# Patient Record
Sex: FEMALE | Race: WHITE | NOT HISPANIC OR LATINO | URBAN - METROPOLITAN AREA
[De-identification: names, ages, dates, MRNs, and addresses within clinical notes are randomized per-mention and may not be internally consistent; named-entity substitution may affect disease eponyms.]

---

## 2021-08-10 ENCOUNTER — NEW PATIENT COMPREHENSIVE (OUTPATIENT)
Dept: URBAN - METROPOLITAN AREA CLINIC 53 | Facility: CLINIC | Age: 56
End: 2021-08-10

## 2021-08-10 DIAGNOSIS — E11.9: ICD-10-CM

## 2021-08-10 DIAGNOSIS — H25.12: ICD-10-CM

## 2021-08-10 DIAGNOSIS — H43.813: ICD-10-CM

## 2021-08-10 DIAGNOSIS — H40.013: ICD-10-CM

## 2021-08-10 DIAGNOSIS — H26.491: ICD-10-CM

## 2021-08-10 DIAGNOSIS — H04.123: ICD-10-CM

## 2021-08-10 PROBLEM — Z98.890: Noted: 2021-08-10

## 2021-08-10 PROCEDURE — 76514 ECHO EXAM OF EYE THICKNESS: CPT

## 2021-08-10 PROCEDURE — 92015 DETERMINE REFRACTIVE STATE: CPT

## 2021-08-10 PROCEDURE — 92004 COMPRE OPH EXAM NEW PT 1/>: CPT

## 2021-08-10 PROCEDURE — 66821 AFTER CATARACT LASER SURGERY: CPT

## 2021-08-10 PROCEDURE — 92134 CPTRZ OPH DX IMG PST SGM RTA: CPT

## 2021-08-10 ASSESSMENT — PACHYMETRY
OS_CT_UM: 577
OD_CT_UM: 577

## 2021-08-10 ASSESSMENT — VISUAL ACUITY
OS_GLARE: 20/100
OD_GLARE: 20/100
OS_CC: J2 @ 16IN
OD_SC: J1 @ 13IN
OS_GLARE: 20/150
OD_CC: J5 @ 16IN
OD_SC: 20/400
OU_CC: 20/25
OU_SC: J1+ @ 13IN
OS_CC: 20/25
OS_SC: CF 6FT
OS_PH: 20/150
OU_CC: J1 @ 16IN
OD_GLARE: 20/150
OD_CC: 20/40+1
OS_SC: J1+ @ 13IN
OD_PH: 20/80

## 2021-08-10 ASSESSMENT — TONOMETRY
OD_IOP_MMHG: 17
OS_IOP_MMHG: 20
OS_IOP_MMHG: 18
OD_IOP_MMHG: 19

## 2021-08-10 NOTE — PATIENT DISCUSSION
Patient has been followed as a glaucoma suspect in the past. IOP stable OU. Increased c:d ratio. Schedule next available HVF/RNFL OCT. Request previous glaucoma records.

## 2021-08-10 NOTE — PATIENT DISCUSSION
PCO ([id number]): Visually significant PCO present on exam today. Recommend YAG laser capsulotomy to improve vision and decrease glare symptoms. RBAs of procedure discussed. Patient agrees and wishes to proceed.

## 2021-08-10 NOTE — PATIENT DISCUSSION
Patient currently using AT's QID. Advised to switch to PF AT's. Start Gel AT's Qhs. Patient has used [employer] in the past but never noticed improvement. Discussed option of Hydrogel punctal plugs. Will plan for next visit.

## 2021-08-10 NOTE — PATIENT DISCUSSION
Patient just moved to the area and is not established with a PCP at this time. DM letter given to patient.

## 2021-08-25 ENCOUNTER — POST-OP LASER (OUTPATIENT)
Dept: URBAN - METROPOLITAN AREA CLINIC 53 | Facility: CLINIC | Age: 56
End: 2021-08-25

## 2021-08-25 DIAGNOSIS — Z98.890: ICD-10-CM

## 2021-08-25 PROCEDURE — 99024 POSTOP FOLLOW-UP VISIT: CPT

## 2021-08-25 ASSESSMENT — VISUAL ACUITY
OS_CC: 20/20
OD_CC: J1+
OD_CC: 20/20
OS_CC: J1+

## 2021-08-25 ASSESSMENT — TONOMETRY
OD_IOP_MMHG: 16
OS_IOP_MMHG: 16

## 2021-08-25 NOTE — PATIENT DISCUSSION
Discussed dry eye regiment with patient. Start PF tears OU 3-4x/day. Start warm compresses OU BID. Start lid scrubs OU BID. Patient would like to hold off on PP at this time. Patient to RTC when interested in the PP. Discussed possibly restarting RX drop patient declined at this time. Patient advised to give eyes a break when on the computer for a long period at a time. RTC in 3-4 months for f/u appointment with Macy Williamson.

## 2021-09-28 ENCOUNTER — DIAGNOSTICS - HVF/OCT (OUTPATIENT)
Dept: URBAN - METROPOLITAN AREA CLINIC 53 | Facility: CLINIC | Age: 56
End: 2021-09-28

## 2021-09-28 DIAGNOSIS — H40.013: ICD-10-CM

## 2021-09-28 PROCEDURE — 92133 CPTRZD OPH DX IMG PST SGM ON: CPT

## 2021-09-28 PROCEDURE — 92083 EXTENDED VISUAL FIELD XM: CPT

## 2021-09-28 NOTE — PATIENT DISCUSSION
Discussed dry eye regiment with patient. Start PF tears OU 3-4x/day. Start warm compresses OU BID. Start lid scrubs OU BID. Patient would like to hold off on PP at this time. Patient to RTC when interested in the PP. Discussed possibly restarting RX drop patient declined at this time. Patient advised to give eyes a break when on the computer for a long period at a time. RTC in 3-4 months for f/u appointment with Johanna Aguirre.

## 2021-12-15 ENCOUNTER — FOLLOW UP (OUTPATIENT)
Dept: URBAN - METROPOLITAN AREA CLINIC 53 | Facility: CLINIC | Age: 56
End: 2021-12-15

## 2021-12-15 DIAGNOSIS — H16.223: ICD-10-CM

## 2021-12-15 PROCEDURE — 92012 INTRM OPH EXAM EST PATIENT: CPT

## 2021-12-15 PROCEDURE — 92015 DETERMINE REFRACTIVE STATE: CPT

## 2021-12-15 ASSESSMENT — VISUAL ACUITY
OS_PH: 20/20
OD_CC: 20/20
OS_CC: 20/30

## 2021-12-15 ASSESSMENT — TONOMETRY
OD_IOP_MMHG: 17
OS_IOP_MMHG: 18

## 2021-12-15 NOTE — PATIENT DISCUSSION
Juli Spangler done today in office with patient consent, images reviewed with patient. Lipiflow discussed, patient was made aware this does not cure dry eye simply restarts glands. Patient to continue OTC regiment as instructed. Continue PF tears OU 3-4x/day. Continue warm compresses OU BID. Continue lid scrubs OU BID. Patient would like to hold off on PP at this time. Patient to RTC when interested in the PP. Discussed possibly restarting RX drop patient declined due to previously on Xiidra and Restasis. Patient would like RX for Cequa drop OU BID. RTC in 6-8 weeks after starting drop.  Patient advised to give eyes a break when on the computer for a long period at a time. Lipiflow promotion discussed patient to schedule at convenience. RTC for 6 week f/u after lipiflow.

## 2022-02-10 ENCOUNTER — CLINIC PROCEDURE ONLY (OUTPATIENT)
Dept: URBAN - METROPOLITAN AREA CLINIC 53 | Facility: CLINIC | Age: 57
End: 2022-02-10

## 2022-02-10 DIAGNOSIS — H16.223: ICD-10-CM

## 2022-02-10 PROCEDURE — 0207T CLEAR EYELID GLAND W/HEAT: CPT

## 2022-02-10 NOTE — PROCEDURE NOTE: CLINICAL
PROCEDURE NOTE: LipiFlow All 4 Lids. Diagnosis: Keratoconjunctivitis Sicca, Not Specified As Sjögren's. Lipiflow was not performed today. Patient changed her mind. States she cannot tolerate things being that close to eyes. Went over patients current Dry Eye therapy and stressed the importance of compliance to reduce dry eye symptoms. Zack Jerry

## 2022-02-10 NOTE — PATIENT DISCUSSION
Chica Martel done today in office with patient consent, images reviewed with patient. Lipiflow discussed, patient was made aware this does not cure dry eye simply restarts glands. Patient to continue OTC regiment as instructed. Continue PF tears OU 3-4x/day. Continue warm compresses OU BID. Continue lid scrubs OU BID. Patient would like to hold off on PP at this time. Patient to RTC when interested in the PP. Discussed possibly restarting RX drop patient declined due to previously on Xiidra and Restasis. Patient would like RX for Cequa drop OU BID. RTC in 6-8 weeks after starting drop.  Patient advised to give eyes a break when on the computer for a long period at a time. Lipiflow promotion discussed patient to schedule at convenience. RTC for 6 week f/u after lipiflow.

## 2022-08-23 ENCOUNTER — COMPREHENSIVE EXAM (OUTPATIENT)
Dept: URBAN - METROPOLITAN AREA CLINIC 53 | Facility: CLINIC | Age: 57
End: 2022-08-23

## 2022-08-23 DIAGNOSIS — E11.9: ICD-10-CM

## 2022-08-23 DIAGNOSIS — H40.013: ICD-10-CM

## 2022-08-23 DIAGNOSIS — H00.14: ICD-10-CM

## 2022-08-23 DIAGNOSIS — H25.12: ICD-10-CM

## 2022-08-23 DIAGNOSIS — H43.813: ICD-10-CM

## 2022-08-23 PROCEDURE — 92015 DETERMINE REFRACTIVE STATE: CPT

## 2022-08-23 PROCEDURE — 92014 COMPRE OPH EXAM EST PT 1/>: CPT

## 2022-08-23 PROCEDURE — 92134 CPTRZ OPH DX IMG PST SGM RTA: CPT

## 2022-08-23 ASSESSMENT — TONOMETRY
OD_IOP_MMHG: 17
OD_IOP_MMHG: 19
OS_IOP_MMHG: 19
OS_IOP_MMHG: 17

## 2022-08-23 ASSESSMENT — VISUAL ACUITY
OS_CC: 20/40-1
OD_CC: 20/20-1
OS_PH: 20/20-1

## 2022-08-23 NOTE — PATIENT DISCUSSION
Mariaelena Styles done today in office with patient consent, images reviewed with patient. Lipiflow discussed, patient was made aware this does not cure dry eye simply restarts glands. Patient to continue OTC regiment as instructed. Continue PF tears OU 3-4x/day. Continue warm compresses OU BID. Continue lid scrubs OU BID. Patient would like to hold off on PP at this time. Patient to RTC when interested in the PP. Discussed possibly restarting RX drop patient declined due to previously on Xiidra and Restasis. Patient would like RX for Cequa drop OU BID. RTC in 6-8 weeks after starting drop.  Patient advised to give eyes a break when on the computer for a long period at a time. Lipiflow promotion discussed patient to schedule at convenience. RTC for 6 week f/u after lipiflow.

## 2023-06-06 ENCOUNTER — CONTACT LENSES/GLASSES VISIT (OUTPATIENT)
Dept: URBAN - METROPOLITAN AREA CLINIC 53 | Facility: CLINIC | Age: 58
End: 2023-06-06

## 2023-06-06 DIAGNOSIS — H52.4: ICD-10-CM

## 2023-06-06 PROCEDURE — 92015 DETERMINE REFRACTIVE STATE: CPT

## 2023-06-06 ASSESSMENT — VISUAL ACUITY
OS_CC: 20/60
OU_CC: J1+
OU_CC: 20/20
OD_CC: 20/20

## 2023-08-15 ENCOUNTER — FOLLOW UP (OUTPATIENT)
Dept: URBAN - METROPOLITAN AREA CLINIC 53 | Facility: CLINIC | Age: 58
End: 2023-08-15

## 2023-08-15 DIAGNOSIS — H35.372: ICD-10-CM

## 2023-08-15 DIAGNOSIS — H43.813: ICD-10-CM

## 2023-08-15 DIAGNOSIS — H25.12: ICD-10-CM

## 2023-08-15 DIAGNOSIS — H40.1131: ICD-10-CM

## 2023-08-15 PROCEDURE — 92133 CPTRZD OPH DX IMG PST SGM ON: CPT

## 2023-08-15 PROCEDURE — 92134 CPTRZ OPH DX IMG PST SGM RTA: CPT

## 2023-08-15 PROCEDURE — 99214 OFFICE O/P EST MOD 30 MIN: CPT

## 2023-08-15 ASSESSMENT — VISUAL ACUITY
OS_PH: 20/30
OD_CC: 20/20
OS_CC: 20/80
OS_GLARE: 20/70
OS_GLARE: 20/40

## 2023-08-15 ASSESSMENT — TONOMETRY
OD_IOP_MMHG: 20
OS_IOP_MMHG: 22
OS_IOP_MMHG: 20
OD_IOP_MMHG: 22

## 2023-09-12 ENCOUNTER — FOLLOW UP (OUTPATIENT)
Dept: URBAN - METROPOLITAN AREA CLINIC 53 | Facility: CLINIC | Age: 58
End: 2023-09-12

## 2023-09-12 DIAGNOSIS — H40.1131: ICD-10-CM

## 2023-09-12 DIAGNOSIS — H25.12: ICD-10-CM

## 2023-09-12 PROCEDURE — 99214 OFFICE O/P EST MOD 30 MIN: CPT

## 2023-09-12 ASSESSMENT — TONOMETRY
OD_IOP_MMHG: 17
OD_IOP_MMHG: 19
OS_IOP_MMHG: 17
OS_IOP_MMHG: 19

## 2023-09-12 ASSESSMENT — VISUAL ACUITY
OS_PH: 20/60
OS_SC: 20/200
OS_GLARE: 20/40
OD_SC: 20/20
OS_GLARE: 20/60

## 2023-09-21 ENCOUNTER — PRE-OP/H&P (OUTPATIENT)
Dept: URBAN - METROPOLITAN AREA CLINIC 53 | Facility: CLINIC | Age: 58
End: 2023-09-21

## 2023-09-21 DIAGNOSIS — H25.12: ICD-10-CM

## 2023-09-21 PROCEDURE — PREOP PRE OP VISIT

## 2023-09-21 PROCEDURE — 92025CV CORNEAL TOPOGRAPHY, CV

## 2023-09-21 PROCEDURE — 92136 OPHTHALMIC BIOMETRY: CPT

## 2023-09-21 ASSESSMENT — KERATOMETRY
OD_AXISANGLE_DEGREES: 121
OS_AXISANGLE2_DEGREES: 160
OD_AXISANGLE2_DEGREES: 31
OD_K2POWER_DIOPTERS: 42.75
OD_K1POWER_DIOPTERS: 43.75
OS_K2POWER_DIOPTERS: 43.25
OS_AXISANGLE_DEGREES: 070
OS_K1POWER_DIOPTERS: 44.87

## 2023-09-21 ASSESSMENT — VISUAL ACUITY
OD_CC: 20/20
OS_CC: 20/150
OU_CC: J1+
OS_PH: 20/80

## 2023-09-21 ASSESSMENT — TONOMETRY
OS_IOP_MMHG: 20
OD_IOP_MMHG: 20

## 2023-11-09 ENCOUNTER — PRE-OP/H&P (OUTPATIENT)
Dept: URBAN - METROPOLITAN AREA CLINIC 53 | Facility: CLINIC | Age: 58
End: 2023-11-09

## 2023-11-09 DIAGNOSIS — H35.372: ICD-10-CM

## 2023-11-09 DIAGNOSIS — H40.1121: ICD-10-CM

## 2023-11-09 DIAGNOSIS — H25.12: ICD-10-CM

## 2023-11-09 PROCEDURE — PREOP PRE OP VISIT

## 2023-11-09 PROCEDURE — 92136 OPHTHALMIC BIOMETRY: CPT

## 2023-11-09 PROCEDURE — 92134 CPTRZ OPH DX IMG PST SGM RTA: CPT

## 2023-11-09 ASSESSMENT — TONOMETRY
OS_IOP_MMHG: 20
OD_IOP_MMHG: 21
OS_IOP_MMHG: 22
OD_IOP_MMHG: 19

## 2023-11-09 ASSESSMENT — KERATOMETRY
OD_K1POWER_DIOPTERS: 43.75
OS_K1POWER_DIOPTERS: 44.87
OS_AXISANGLE_DEGREES: 070
OD_K2POWER_DIOPTERS: 42.75
OS_AXISANGLE2_DEGREES: 160
OD_AXISANGLE_DEGREES: 121
OS_K2POWER_DIOPTERS: 43.25
OD_AXISANGLE2_DEGREES: 31

## 2023-11-09 ASSESSMENT — VISUAL ACUITY
OD_CC: 20/20
OS_PH: 20/50-1
OS_CC: 20/200

## 2023-11-13 ASSESSMENT — KERATOMETRY
OD_AXISANGLE_DEGREES: 121
OD_AXISANGLE2_DEGREES: 31
OS_AXISANGLE2_DEGREES: 160
OD_K2POWER_DIOPTERS: 42.75
OS_K2POWER_DIOPTERS: 43.25
OD_K1POWER_DIOPTERS: 43.75
OS_AXISANGLE_DEGREES: 070
OS_K1POWER_DIOPTERS: 44.87

## 2023-11-14 ENCOUNTER — SURGERY/PROCEDURE (OUTPATIENT)
Dept: URBAN - METROPOLITAN AREA SURGERY 16 | Facility: SURGERY | Age: 58
End: 2023-11-14

## 2023-11-14 ENCOUNTER — POST-OP (OUTPATIENT)
Dept: URBAN - METROPOLITAN AREA CLINIC 53 | Facility: CLINIC | Age: 58
End: 2023-11-14

## 2023-11-14 DIAGNOSIS — H25.12: ICD-10-CM

## 2023-11-14 DIAGNOSIS — Z96.1: ICD-10-CM

## 2023-11-14 DIAGNOSIS — Z98.42: ICD-10-CM

## 2023-11-14 DIAGNOSIS — H40.1121: ICD-10-CM

## 2023-11-14 PROCEDURE — 99199PCV CUSTOM VISION

## 2023-11-14 PROCEDURE — 66174 TRLUML DIL AQ O/F CAN W/O ST: CPT

## 2023-11-14 PROCEDURE — 66991CV REMOVE CATARACT INSERTION ANTERIOR SEG DRAIN DEVICE, CUSTOM

## 2023-11-14 ASSESSMENT — KERATOMETRY
OD_K2POWER_DIOPTERS: 42.75
OS_K1POWER_DIOPTERS: 44.87
OD_AXISANGLE2_DEGREES: 31
OS_AXISANGLE_DEGREES: 070
OD_AXISANGLE_DEGREES: 121
OD_K1POWER_DIOPTERS: 43.75
OS_K2POWER_DIOPTERS: 43.25
OS_AXISANGLE2_DEGREES: 160

## 2023-11-14 ASSESSMENT — TONOMETRY
OS_IOP_MMHG: 17
OS_IOP_MMHG: 19

## 2023-11-14 ASSESSMENT — VISUAL ACUITY: OS_SC: HM @ 1FT

## 2023-11-27 ENCOUNTER — POST-OP (OUTPATIENT)
Dept: URBAN - METROPOLITAN AREA CLINIC 49 | Facility: LOCATION | Age: 58
End: 2023-11-27

## 2023-11-27 DIAGNOSIS — Z98.42: ICD-10-CM

## 2023-11-27 DIAGNOSIS — H40.1121: ICD-10-CM

## 2023-11-27 DIAGNOSIS — Z96.1: ICD-10-CM

## 2023-11-27 PROCEDURE — 99024 POSTOP FOLLOW-UP VISIT: CPT

## 2023-11-27 ASSESSMENT — VISUAL ACUITY
OD_SC: 20/400
OD_PH: 20/50
OS_SC: 20/150
OS_PH: 20/40

## 2023-11-27 ASSESSMENT — TONOMETRY
OS_IOP_MMHG: 32
OD_IOP_MMHG: 15
OD_IOP_MMHG: 13
OS_IOP_MMHG: 30

## 2023-11-27 ASSESSMENT — KERATOMETRY
OD_AXISANGLE_DEGREES: 41
OD_K2POWER_DIOPTERS: 42.75
OD_K2POWER_DIOPTERS: 43.50
OD_AXISANGLE2_DEGREES: 31
OD_K1POWER_DIOPTERS: 43.75
OS_AXISANGLE2_DEGREES: 63
OS_AXISANGLE2_DEGREES: 160
OD_AXISANGLE2_DEGREES: 131
OS_K2POWER_DIOPTERS: 43.25
OD_K1POWER_DIOPTERS: 42.75
OS_AXISANGLE_DEGREES: 070
OS_K1POWER_DIOPTERS: 44.87
OS_AXISANGLE_DEGREES: 153
OD_AXISANGLE_DEGREES: 121
OS_K2POWER_DIOPTERS: 44.25
OS_K1POWER_DIOPTERS: 43.50

## 2023-12-14 ENCOUNTER — POST-OP (OUTPATIENT)
Dept: URBAN - METROPOLITAN AREA CLINIC 53 | Facility: CLINIC | Age: 58
End: 2023-12-14

## 2023-12-14 DIAGNOSIS — E11.9: ICD-10-CM

## 2023-12-14 DIAGNOSIS — H40.1131: ICD-10-CM

## 2023-12-14 DIAGNOSIS — Z98.42: ICD-10-CM

## 2023-12-14 DIAGNOSIS — H26.492: ICD-10-CM

## 2023-12-14 PROCEDURE — 92015 DETERMINE REFRACTIVE STATE: CPT

## 2023-12-14 PROCEDURE — 99024 POSTOP FOLLOW-UP VISIT: CPT

## 2023-12-14 ASSESSMENT — VISUAL ACUITY
OU_CC: 20/20-1
OD_CC: 20/25-2
OS_CC: 20/25-2

## 2023-12-14 ASSESSMENT — TONOMETRY
OS_IOP_MMHG: 19
OD_IOP_MMHG: 18
OS_IOP_MMHG: 24
OS_IOP_MMHG: 22
OS_IOP_MMHG: 17
OD_IOP_MMHG: 20

## 2023-12-14 ASSESSMENT — KERATOMETRY
OS_AXISANGLE_DEGREES: 153
OS_AXISANGLE2_DEGREES: 63
OS_K1POWER_DIOPTERS: 43.50
OD_AXISANGLE_DEGREES: 036
OD_K1POWER_DIOPTERS: 43.00
OS_K2POWER_DIOPTERS: 44.25
OD_K2POWER_DIOPTERS: 43.50
OD_AXISANGLE2_DEGREES: 126

## 2024-01-05 ENCOUNTER — ESTABLISHED PATIENT (OUTPATIENT)
Dept: URBAN - METROPOLITAN AREA CLINIC 53 | Facility: CLINIC | Age: 59
End: 2024-01-05

## 2024-01-05 DIAGNOSIS — H35.353: ICD-10-CM

## 2024-01-05 DIAGNOSIS — H35.372: ICD-10-CM

## 2024-01-05 DIAGNOSIS — E11.9: ICD-10-CM

## 2024-01-05 PROCEDURE — 92134 CPTRZ OPH DX IMG PST SGM RTA: CPT

## 2024-01-05 PROCEDURE — 99212 OFFICE O/P EST SF 10 MIN: CPT

## 2024-01-05 RX ORDER — KETOROLAC TROMETHAMINE 5 MG/ML: 1 SOLUTION OPHTHALMIC TWICE A DAY

## 2024-01-05 RX ORDER — PREDNISOLONE ACETATE 10 MG/ML: 1 SUSPENSION/ DROPS OPHTHALMIC

## 2024-01-05 RX ORDER — KETOROLAC TROMETHAMINE 5 MG/ML: 1 SOLUTION OPHTHALMIC

## 2024-01-05 RX ORDER — PREDNISOLONE ACETATE 10 MG/ML: 1 SUSPENSION/ DROPS OPHTHALMIC TWICE A DAY

## 2024-01-05 ASSESSMENT — VISUAL ACUITY
OS_CC: 20/40
OS_GLARE: 20/50
OD_CC: 20/25
OS_GLARE: 20/50

## 2024-01-05 ASSESSMENT — KERATOMETRY
OD_AXISANGLE_DEGREES: 036
OD_AXISANGLE2_DEGREES: 126
OS_AXISANGLE2_DEGREES: 63
OS_AXISANGLE_DEGREES: 153
OS_K2POWER_DIOPTERS: 44.25
OD_K2POWER_DIOPTERS: 43.50
OS_K1POWER_DIOPTERS: 43.50
OD_K1POWER_DIOPTERS: 43.00

## 2024-01-05 ASSESSMENT — TONOMETRY
OD_IOP_MMHG: 20
OS_IOP_MMHG: 21
OS_IOP_MMHG: 23
OD_IOP_MMHG: 22

## 2024-04-09 ENCOUNTER — ESTABLISHED PATIENT (OUTPATIENT)
Dept: URBAN - METROPOLITAN AREA CLINIC 48 | Facility: LOCATION | Age: 59
End: 2024-04-09

## 2024-04-09 DIAGNOSIS — B30.9: ICD-10-CM

## 2024-04-09 PROCEDURE — 99214 OFFICE O/P EST MOD 30 MIN: CPT

## 2024-04-09 RX ORDER — TOBRAMYCIN AND DEXAMETHASONE 1; 3 MG/ML; MG/ML
1 SUSPENSION/ DROPS OPHTHALMIC
Start: 2024-04-09

## 2024-04-09 ASSESSMENT — VISUAL ACUITY
OD_CC: 20/25+2
OS_CC: 20/30+2
OU_CC: 20/20

## 2024-04-09 ASSESSMENT — KERATOMETRY
OS_AXISANGLE2_DEGREES: 63
OD_K1POWER_DIOPTERS: 43.00
OD_AXISANGLE_DEGREES: 036
OS_K2POWER_DIOPTERS: 44.25
OS_AXISANGLE_DEGREES: 153
OD_K2POWER_DIOPTERS: 43.50
OS_K1POWER_DIOPTERS: 43.50
OD_AXISANGLE2_DEGREES: 126

## 2024-04-09 ASSESSMENT — TONOMETRY
OS_IOP_MMHG: 19
OD_IOP_MMHG: 18
OD_IOP_MMHG: 20
OS_IOP_MMHG: 21

## 2024-04-16 ENCOUNTER — FOLLOW UP (OUTPATIENT)
Dept: URBAN - METROPOLITAN AREA CLINIC 48 | Facility: LOCATION | Age: 59
End: 2024-04-16

## 2024-04-16 DIAGNOSIS — H26.492: ICD-10-CM

## 2024-04-16 DIAGNOSIS — H16.223: ICD-10-CM

## 2024-04-16 DIAGNOSIS — B30.9: ICD-10-CM

## 2024-04-16 PROCEDURE — 99213 OFFICE O/P EST LOW 20 MIN: CPT

## 2024-04-16 ASSESSMENT — KERATOMETRY
OD_K1POWER_DIOPTERS: 43.00
OD_AXISANGLE2_DEGREES: 126
OS_AXISANGLE2_DEGREES: 63
OS_AXISANGLE_DEGREES: 153
OD_K2POWER_DIOPTERS: 43.50
OD_AXISANGLE_DEGREES: 036
OS_K1POWER_DIOPTERS: 43.50
OS_K2POWER_DIOPTERS: 44.25

## 2024-04-16 ASSESSMENT — TONOMETRY
OS_IOP_MMHG: 20
OD_IOP_MMHG: 18
OD_IOP_MMHG: 20
OS_IOP_MMHG: 18

## 2024-04-16 ASSESSMENT — VISUAL ACUITY
OS_CC: 20/20
OD_CC: 20/20

## 2024-08-01 ENCOUNTER — CONSULTATION/EVALUATION (OUTPATIENT)
Dept: URBAN - METROPOLITAN AREA CLINIC 53 | Facility: CLINIC | Age: 59
End: 2024-08-01

## 2024-08-01 DIAGNOSIS — H26.492: ICD-10-CM

## 2024-08-01 PROCEDURE — 99213 OFFICE O/P EST LOW 20 MIN: CPT | Mod: 25

## 2024-08-01 PROCEDURE — 66821 AFTER CATARACT LASER SURGERY: CPT | Mod: 25,LT

## 2024-08-01 ASSESSMENT — TONOMETRY
OS_IOP_MMHG: 19
OD_IOP_MMHG: 19
OD_IOP_MMHG: 17
OS_IOP_MMHG: 21

## 2024-08-01 ASSESSMENT — VISUAL ACUITY
OU_CC: 20/20
OS_GLARE: 20/30
OD_CC: 20/20
OU_SC: J1+@14"
OS_CC: 20/25-1
OS_GLARE: 20/25
OU_CC: J1+@14"

## 2024-08-20 ENCOUNTER — DIAGNOSTICS ONLY (OUTPATIENT)
Dept: URBAN - METROPOLITAN AREA CLINIC 53 | Facility: CLINIC | Age: 59
End: 2024-08-20

## 2024-08-20 DIAGNOSIS — H40.1131: ICD-10-CM

## 2024-08-20 PROCEDURE — 92083 EXTENDED VISUAL FIELD XM: CPT

## 2024-08-20 PROCEDURE — 92133 CPTRZD OPH DX IMG PST SGM ON: CPT

## 2024-08-23 ENCOUNTER — POST-OP (OUTPATIENT)
Dept: URBAN - METROPOLITAN AREA CLINIC 53 | Facility: CLINIC | Age: 59
End: 2024-08-23

## 2024-08-23 DIAGNOSIS — Z98.890: ICD-10-CM

## 2024-08-23 DIAGNOSIS — H52.203: ICD-10-CM

## 2024-08-23 PROCEDURE — 92015 DETERMINE REFRACTIVE STATE: CPT | Mod: NC

## 2024-08-23 ASSESSMENT — TONOMETRY
OS_IOP_MMHG: 17
OD_IOP_MMHG: 15
OS_IOP_MMHG: 15
OD_IOP_MMHG: 17

## 2024-08-23 ASSESSMENT — VISUAL ACUITY
OS_CC: 20/25+2
OU_CC: J1+@16"
OU_CC: 20/20
OD_CC: 20/20

## 2025-02-13 ENCOUNTER — CONSULTATION/EVALUATION (OUTPATIENT)
Age: 60
End: 2025-02-13

## 2025-02-13 DIAGNOSIS — H43.813: ICD-10-CM

## 2025-02-13 DIAGNOSIS — H35.372: ICD-10-CM

## 2025-02-13 DIAGNOSIS — E11.9: ICD-10-CM

## 2025-02-13 DIAGNOSIS — H33.333: ICD-10-CM

## 2025-02-13 DIAGNOSIS — H40.1131: ICD-10-CM

## 2025-02-13 DIAGNOSIS — H16.223: ICD-10-CM

## 2025-02-13 PROCEDURE — 99214 OFFICE O/P EST MOD 30 MIN: CPT

## 2025-02-13 RX ORDER — TIMOLOL MALEATE 2.5 MG/ML
1 SOLUTION OPHTHALMIC EVERY MORNING
Start: 2025-02-13

## 2025-03-13 ENCOUNTER — CLINIC PROCEDURE ONLY (OUTPATIENT)
Age: 60
End: 2025-03-13

## 2025-03-13 DIAGNOSIS — H40.1131: ICD-10-CM

## 2025-03-13 PROCEDURE — 65855 TRABECULOPLASTY LASER SURG: CPT | Mod: 54,RT

## 2025-03-13 RX ORDER — FLUOROMETHOLONE 1 MG/ML
1 SOLUTION/ DROPS OPHTHALMIC
Start: 2025-03-13

## 2025-04-09 ENCOUNTER — EMERGENCY VISIT (OUTPATIENT)
Age: 60
End: 2025-04-09

## 2025-04-09 DIAGNOSIS — H40.1131: ICD-10-CM

## 2025-04-09 DIAGNOSIS — H16.223: ICD-10-CM

## 2025-04-09 PROCEDURE — 99214 OFFICE O/P EST MOD 30 MIN: CPT
